# Patient Record
Sex: MALE | Race: WHITE | NOT HISPANIC OR LATINO | Employment: FULL TIME | URBAN - NONMETROPOLITAN AREA
[De-identification: names, ages, dates, MRNs, and addresses within clinical notes are randomized per-mention and may not be internally consistent; named-entity substitution may affect disease eponyms.]

---
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
## 2020-10-04 ENCOUNTER — VIRTUAL VISIT (OUTPATIENT)
Dept: FAMILY MEDICINE | Facility: OTHER | Age: 20
End: 2020-10-04

## 2020-10-05 ENCOUNTER — ALLIED HEALTH/NURSE VISIT (OUTPATIENT)
Dept: FAMILY MEDICINE | Facility: OTHER | Age: 20
End: 2020-10-05
Attending: NURSE PRACTITIONER
Payer: COMMERCIAL

## 2020-10-05 DIAGNOSIS — Z20.822 EXPOSURE TO COVID-19 VIRUS: Primary | ICD-10-CM

## 2020-10-05 DIAGNOSIS — R07.0 THROAT PAIN: ICD-10-CM

## 2020-10-05 PROCEDURE — C9803 HOPD COVID-19 SPEC COLLECT: HCPCS

## 2020-10-05 PROCEDURE — 99207 PR NO CHARGE LOS: CPT

## 2020-10-05 PROCEDURE — U0003 INFECTIOUS AGENT DETECTION BY NUCLEIC ACID (DNA OR RNA); SEVERE ACUTE RESPIRATORY SYNDROME CORONAVIRUS 2 (SARS-COV-2) (CORONAVIRUS DISEASE [COVID-19]), AMPLIFIED PROBE TECHNIQUE, MAKING USE OF HIGH THROUGHPUT TECHNOLOGIES AS DESCRIBED BY CMS-2020-01-R: HCPCS | Mod: ZL | Performed by: FAMILY MEDICINE

## 2020-10-06 LAB
SARS-COV-2 RNA SPEC QL NAA+PROBE: NOT DETECTED
SPECIMEN SOURCE: NORMAL

## 2020-10-07 NOTE — PROGRESS NOTES
"Date: 10/04/2020 13:16:16  Clinician: Alex Antony  Clinician NPI: [id number]  Patient: Bruce Chandra  Patient : 2000  Patient Address: [address] [place] [address]  Patient Phone: [phone]  Visit Protocol: URI  Patient Summary:  Bruce is a 20 year old ( : 2000 ) male who initiated a [facility] Visit for COVID-19 (Coronavirus) evaluation and screening. When asked the question \"Please sign me up to receive news, health information and promotions. \", Bruce responded \"No\".    Bruce states his symptoms started 1-2 days ago.   His symptoms consist of wheezing, a cough, rhinitis, malaise, and a sore throat.   Symptom details     Nasal secretions: The color of his mucus is yellow and green.    Cough: Bruce coughs a few times an hour and his cough is not more bothersome at night. Phlegm comes into his throat when he coughs. He does not believe his cough is caused by post-nasal drip. The color of the phlegm is yellow.     Sore throat: Bruce reports having moderate throat pain (4-6 on a 10 point pain scale), does not have exudate on his tonsils, and can swallow liquids. He is not sure if the lymph nodes in his neck are enlarged. A rash has not appeared on the skin since the sore throat started.     Wheezing: Bruce has not ever been diagnosed with asthma. Additional wheezing details as reported by the patient (free text): Has not        Bruce denies having ear pain, headache, fever, nasal congestion, anosmia, vomiting, nausea, facial pain or pressure, myalgias, chills, teeth pain, ageusia, and diarrhea. He also denies taking antibiotic medication in the past month and having recent facial or sinus surgery in the past 60 days. He is not experiencing dyspnea.   Precipitating events  Within the past week, Bruce has not been exposed to someone with strep throat. He has not recently been exposed to someone with influenza. Bruce has been in close contact with the following high risk individuals: adults 65 or " older and immunocompromised people.   Pertinent COVID-19 (Coronavirus) information  In the past 14 days, Bruce has not worked in a congregate living setting.   He does not work or volunteer as healthcare worker or a  and does not work or volunteer in a healthcare facility.   Bruce also has not lived in a congregate living setting in the past 14 days. He does not live with a healthcare worker.   Bruce has had a close contact with a laboratory-confirmed COVID-19 patient within 14 days of symptom onset. Additional information about contact with COVID-19 (Coronavirus) patient as reported by the patient (free text): Prisca Barriga tested positive today and was tested on Friday and I was with her Friday   Since December 2019, Bruce and has had upper respiratory infection (URI) or influenza-like illness. Has not been diagnosed with lab-confirmed COVID-19 test      Date(s) of previous URI or influenza-like illness (free-text): December 2019     Symptoms Bruce experienced during previous URI or influenza-like illness as reported by the patient (free-text): Throwing up coughcouldnt breathe        Pertinent medical history  Brcue needs a return to work/school note.   Weight: 180 lbs   Bruce smokes or uses smokeless tobacco.   Weight: 180 lbs    MEDICATIONS: No current medications, ALLERGIES: NKDA  Clinician Response:  Dear Bruce,   Your symptoms show that you may have coronavirus (COVID-19). This illness can cause fever, cough and trouble breathing. Many people get a mild case and get better on their own. Some people can get very sick.  What should I do?  We would like to test you for this virus.   1. Please call [phone] to schedule your visit. Explain [facility]. Be ready to share your [facility] visit ID number.  The following will serve as your written order for this COVID Test, ordered by me, for the indication of suspected COVID [Z20.828]: The test will be ordered  "in Epic, our electronic health record, after you are scheduled. It will show as ordered and authorized by Manpreet Mcdaniel MD.  Order: COVID-19 (Coronavirus) PCR for SYMPTOMATIC testing from [facility].      2. When it's time for your COVID test:  Stay at least 6 feet away from others. (If someone will drive you to your test, stay in the backseat, as far away from the  as you can.)   Cover your mouth and nose with a mask, tissue or washcloth.  Go straight to the testing site. Don't make any stops on the way there or back.      3.Starting now: Stay home and away from others (self-isolate) until:   You've had no fever---and no medicine that reduces fever---for one full day (24 hours). And...   Your other symptoms have gotten better. For example, your cough or breathing has improved. And...   At least 10 days have passed since your symptoms started.       During this time, don't leave the house except for testing or medical care.   Stay in your own room, even for meals. Use your own bathroom if you can.   Stay away from others in your home. No hugging, kissing or shaking hands. No visitors.  Don't go to work, school or anywhere else.    Clean \"high touch\" surfaces often (doorknobs, counters, handles, etc.). Use a household cleaning spray or wipes. You'll find a full list of  on the EPA website: www.epa.gov/pesticide-registration/list-n-disinfectants-use-against-sars-cov-2.   Cover your mouth and nose with a mask, tissue or washcloth to avoid spreading germs.  Wash your hands and face often. Use soap and water.  Caregivers in these groups are at risk for severe illness due to COVID-19:  o People 65 years and older  o People who live in a nursing home or long-term care facility  o People with chronic disease (lung, heart, cancer, diabetes, kidney, liver, immunologic)  o People who have a weakened immune system, including those who:   Are in cancer treatment  Take medicine that weakens the immune system, such as " corticosteroids  Had a bone marrow or organ transplant  Have an immune deficiency  Have poorly controlled HIV or AIDS  Are obese (body mass index of 40 or higher)  Smoke regularly   o Caregivers should wear gloves while washing dishes, handling laundry and cleaning bedrooms and bathrooms.  o Use caution when washing and drying laundry: Don't shake dirty laundry, and use the warmest water setting that you can.  o For more tips, go to www.cdc.gov/coronavirus/2019-ncov/downloads/10Things.pdf.    How can I take care of myself?    Get lots of rest. Drink extra fluids (unless a doctor has told you not to).   Take Tylenol (acetaminophen) for fever or pain. If you have liver or kidney problems, ask your family doctor if it's okay to take Tylenol.   Adults can take either:    650 mg (two 325 mg pills) every 4 to 6 hours, or...   1,000 mg (two 500 mg pills) every 8 hours as needed.    Note: Don't take more than 3,000 mg in one day. Acetaminophen is found in many medicines (both prescribed and over-the-counter medicines). Read all labels to be sure you don't take too much.   For children, check the Tylenol bottle for the right dose. The dose is based on the child's age or weight.    If you have other health problems (like cancer, heart failure, an organ transplant or severe kidney disease): Call your specialty clinic if you don't feel better in the next 2 days.       Know when to call 911. Emergency warning signs include:    Trouble breathing or shortness of breath Pain or pressure in the chest that doesn't go away Feeling confused like you haven't felt before, or not being able to wake up Bluish-colored lips or face.  Where can I get more information?    [facility] -- About COVID-19: [email or web address]   CDC -- What to Do If You're Sick: www.cdc.gov/coronavirus/2019-ncov/about/steps-when-sick.html   CDC -- Ending Home Isolation: www.cdc.gov/coronavirus/2019-ncov/hcp/disposition-in-home-patients.html   [facility]  -- Caring for Someone: www.cdc.gov/coronavirus/2019-ncov/if-you-are-sick/care-for-someone.html   [facility] -- Interim Guidance for Hospital Discharge to Home: www.health.[facility].[place]/diseases/coronavirus/hcp/hospdischarge.pdf   [facility] clinical trials (COVID-19 research studies): clinicalaffairs.[facility]/n-clinical-trials    Below are the COVID-19 hotlines at the [place] Department of Health ([facility]). Interpreters are available.    For health questions: Call 374-843-8695 or 1-353.250.6647 (7 a.m. to 7 p.m.) For questions about schools and childcare: Call 226-919-6171 or 1-611.813.9454 (7 a.m. to 7 p.m.)    Diagnosis: Other malaise  Diagnosis ICD: R53.81  Prescription: albuterol sulfate (Proventil HFA) 90 mcg/actuation inhalation HFA aerosol inhaler 1 200 inhalation aerosol with adapter (proventil hfa or equivalent), 0 days supply. Inhale 2 puffs every 4 hours as needed. Refills: 0, Refill as needed: no, Allow substitutions: yes

## 2020-11-04 ENCOUNTER — ALLIED HEALTH/NURSE VISIT (OUTPATIENT)
Dept: FAMILY MEDICINE | Facility: OTHER | Age: 20
End: 2020-11-04
Payer: COMMERCIAL

## 2020-11-04 DIAGNOSIS — R05.9 COUGH: ICD-10-CM

## 2020-11-04 DIAGNOSIS — Z20.822 EXPOSURE TO 2019 NOVEL CORONAVIRUS: Primary | ICD-10-CM

## 2020-11-04 DIAGNOSIS — R09.89 RUNNY NOSE: ICD-10-CM

## 2020-11-04 PROCEDURE — 99207 PR NO CHARGE NURSE ONLY: CPT

## 2020-11-04 PROCEDURE — U0003 INFECTIOUS AGENT DETECTION BY NUCLEIC ACID (DNA OR RNA); SEVERE ACUTE RESPIRATORY SYNDROME CORONAVIRUS 2 (SARS-COV-2) (CORONAVIRUS DISEASE [COVID-19]), AMPLIFIED PROBE TECHNIQUE, MAKING USE OF HIGH THROUGHPUT TECHNOLOGIES AS DESCRIBED BY CMS-2020-01-R: HCPCS | Mod: ZL

## 2020-11-04 PROCEDURE — C9803 HOPD COVID-19 SPEC COLLECT: HCPCS

## 2020-11-04 NOTE — PROGRESS NOTES
Patient is here for covid testing for exposure, runny nose, congestion.   Dayana Santiago LPN on 11/4/2020 at 8:11 AM

## 2020-11-05 LAB
SARS-COV-2 RNA SPEC QL NAA+PROBE: NOT DETECTED
SPECIMEN SOURCE: NORMAL

## 2020-11-15 ENCOUNTER — HOSPITAL ENCOUNTER (EMERGENCY)
Facility: OTHER | Age: 20
Discharge: HOME | End: 2020-11-15
Attending: FAMILY MEDICINE | Admitting: FAMILY MEDICINE
Payer: COMMERCIAL

## 2020-11-15 VITALS
SYSTOLIC BLOOD PRESSURE: 138 MMHG | RESPIRATION RATE: 18 BRPM | HEIGHT: 72 IN | BODY MASS INDEX: 24.38 KG/M2 | DIASTOLIC BLOOD PRESSURE: 77 MMHG | WEIGHT: 180 LBS | OXYGEN SATURATION: 98 % | TEMPERATURE: 97.9 F | HEART RATE: 83 BPM

## 2020-11-15 DIAGNOSIS — S01.01XA SCALP LACERATION, INITIAL ENCOUNTER: ICD-10-CM

## 2020-11-15 PROCEDURE — 99283 EMERGENCY DEPT VISIT LOW MDM: CPT | Mod: 25 | Performed by: FAMILY MEDICINE

## 2020-11-15 PROCEDURE — 99282 EMERGENCY DEPT VISIT SF MDM: CPT | Mod: 25 | Performed by: FAMILY MEDICINE

## 2020-11-15 PROCEDURE — 12002 RPR S/N/AX/GEN/TRNK2.6-7.5CM: CPT | Performed by: FAMILY MEDICINE

## 2020-11-15 PROCEDURE — 250N000011 HC RX IP 250 OP 636: Performed by: FAMILY MEDICINE

## 2020-11-15 PROCEDURE — 90471 IMMUNIZATION ADMIN: CPT | Performed by: FAMILY MEDICINE

## 2020-11-15 PROCEDURE — 90715 TDAP VACCINE 7 YRS/> IM: CPT | Performed by: FAMILY MEDICINE

## 2020-11-15 RX ADMIN — TETANUS TOXOID, REDUCED DIPHTHERIA TOXOID AND ACELLULAR PERTUSSIS VACCINE, ADSORBED 0.5 ML: 5; 2.5; 8; 8; 2.5 SUSPENSION INTRAMUSCULAR at 04:10

## 2020-11-15 ASSESSMENT — MIFFLIN-ST. JEOR: SCORE: 1864.47

## 2020-11-15 NOTE — ED PROVIDER NOTES
History     Chief Complaint   Patient presents with     Head Laceration     HPI  Bruce Rodriguez is a 20 year old male who presents to the ER with laceration to his head that he sustained about 2 hours ago. He does not think he had LOC. Came in because his head hurt where the laceration was.     Allergies:  Allergies   Allergen Reactions     Seasonal Allergies      Stuffed up in the Spring and Fall       Problem List:    Patient Active Problem List    Diagnosis Date Noted     Allergic rhinitis 02/24/2018     Priority: Medium     Concussion 02/15/2011     Priority: Medium     Dysthymic disorder 12/14/2010     Priority: Medium        Past Medical History:    Past Medical History:   Diagnosis Date     Personal history of other medical treatment (CODE)      Personal history of other medical treatment (CODE)        Past Surgical History:    Past Surgical History:   Procedure Laterality Date     OTHER SURGICAL HISTORY      90220.0,PAST SURGICAL HISTORY,06/01 PE tube placement       Family History:    Family History   Problem Relation Age of Onset     Other - See Comments Father         Paralized in accident; lower legs.     Genetic Disorder Other         Genetic,Mother Anxiety disorder/depression.  Mom was in MVA requiring back surgery.     Genetic Disorder Other         Genetic,Mother Anxiety disorder/depression.  Mom was in MVA requiring back surgery.-Dad MVA - in wheelchair.       Social History:  Marital Status:  Single [1]  Social History     Tobacco Use     Smoking status: Current Every Day Smoker     Smokeless tobacco: Current User     Tobacco comment: Quit smoking: mom outside   Substance Use Topics     Alcohol use: No     Drug use: Unknown     Types: Other     Comment: Drug use: No        Medications:    No current outpatient medications on file.        Review of Systems   All other systems reviewed and are negative.      Physical Exam   BP: 138/77  Pulse: 111  Temp: 97.9  F (36.6  C)  Resp: 18  Height: 182.9 cm  (6')  Weight: 81.6 kg (180 lb)  SpO2: 98 %      Physical Exam  Vitals signs and nursing note reviewed.   Skin:     General: Skin is warm.      Comments: Laceration on the right occiput 2 inches in length         ED Course   [facility] Procedure Note        Laceration Repair:    Performed by: Armin Melendez MD  Authorized by: Armin Melendez MD  Consent given by: Patient who states understanding of the procedure being performed after discussing the risks, benefits and alternatives.    Preparation: Patient was prepped and draped in usual sterile fashion.  Irrigation solution: saline    Body area:scalp  Laceration length: 5cm  Contamination: The wound is not contaminated.  Foreign bodies:none  Tendon involvement: none  Anesthesia: Local  Local anesthetic: Lidocaine     1%, with epinephrine  Anesthetic total: 3ml    Debridement: none  Skin closure: Closed with 7 Staples  Technique: interrupted  Approximation: close  Approximation difficulty: simple    Patient tolerance: Patient tolerated the procedure well with no immediate complications.       No results found for this or any previous visit (from the past 24 hour(s)).    Medications   Tdap (tetanus-diptheria-acell pertussis) (BOOSTRIX) injection 0.5 mL (has no administration in time range)       Assessments & Plan (with Medical Decision Making)     I have reviewed the nursing notes.    I have reviewed the findings, diagnosis, plan and need for follow up with the patient.    New Prescriptions    No medications on file       Final diagnoses:   Scalp laceration, initial encounter       11/15/2020   [facility]     Armin Melendez MD  11/15/20 0409

## 2020-11-15 NOTE — ED TRIAGE NOTES
Pt presents to the ER with a fall that happened approx 2 hours ago.  Pt slipped outside and hit his head on a wood bin.  Pt has a laceration to the back of his head.  Bleeding is controlled.  Pt states he has a headache.  Does not know if he lost consciousness.  Admits to having a few drinks tonight.

## 2020-11-15 NOTE — ED AVS SNAPSHOT
[facility]  [address]  [place] [address]  Phone: [phone]  Fax: [phone]                                    Bruce Rodriguez   MRN: [id number]    Department: [facility]   Date of Visit: 11/15/2020           After Visit Summary Signature Page    I have received my discharge instructions, and my questions have been answered. I have discussed any challenges I see with this plan with the nurse or doctor.    ..........................................................................................................................................  Patient/Patient Representative Signature      ..........................................................................................................................................  Patient Representative Print Name and Relationship to Patient    ..................................................               ................................................  Date                                   Time    ..........................................................................................................................................  Reviewed by Signature/Title    ...................................................              ..............................................  Date                                               Time          22EPIC Rev 08/18

## 2020-11-15 NOTE — DISCHARGE INSTRUCTIONS
You can wash your hair when you get home tonight, but then I would try not to wash for 24-48 hours.   Staples removed in 10 days.

## 2020-12-27 ENCOUNTER — HEALTH MAINTENANCE LETTER (OUTPATIENT)
Age: 20
End: 2020-12-27

## 2021-05-13 ENCOUNTER — HOSPITAL ENCOUNTER (EMERGENCY)
Facility: OTHER | Age: 21
Discharge: HOME | End: 2021-05-13
Attending: PHYSICIAN ASSISTANT | Admitting: PHYSICIAN ASSISTANT
Payer: COMMERCIAL

## 2021-05-13 VITALS
SYSTOLIC BLOOD PRESSURE: 144 MMHG | BODY MASS INDEX: 26.9 KG/M2 | WEIGHT: 203 LBS | OXYGEN SATURATION: 98 % | RESPIRATION RATE: 18 BRPM | DIASTOLIC BLOOD PRESSURE: 96 MMHG | TEMPERATURE: 97.7 F | HEART RATE: 95 BPM | HEIGHT: 73 IN

## 2021-05-13 DIAGNOSIS — R68.84 JAW PAIN: ICD-10-CM

## 2021-05-13 PROCEDURE — 99283 EMERGENCY DEPT VISIT LOW MDM: CPT | Performed by: PHYSICIAN ASSISTANT

## 2021-05-13 PROCEDURE — 99282 EMERGENCY DEPT VISIT SF MDM: CPT | Performed by: PHYSICIAN ASSISTANT

## 2021-05-13 RX ORDER — NAPROXEN SODIUM 550 MG
550 TABLET ORAL 2 TIMES DAILY WITH MEALS
Qty: 20 TABLET | Refills: 0 | Status: SHIPPED | OUTPATIENT
Start: 2021-05-13 | End: 2021-05-23

## 2021-05-13 RX ORDER — PENICILLIN V POTASSIUM 500 MG/1
500 TABLET, FILM COATED ORAL 4 TIMES DAILY
Qty: 40 TABLET | Refills: 0 | Status: SHIPPED | OUTPATIENT
Start: 2021-05-13 | End: 2021-05-23

## 2021-05-13 ASSESSMENT — MIFFLIN-ST. JEOR: SCORE: 1984.68

## 2021-05-14 NOTE — ED TRIAGE NOTES
"ED Nursing Triage Note (General)   ________________________________    Bruce ANCELMO Rodriguez is a 20 year old Male that presents to triage private car  With history of  Left side, bottom molar pain that started today.  BP (!) 144/96   Pulse 95   Temp 97.7  F (36.5  C) (Tympanic)   Resp 18   Ht 1.854 m (6' 1\")   Wt 92.1 kg (203 lb)   SpO2 98%   BMI 26.78 kg/m  t  Patient appears alert  and oriented, in no acute distress., and cooperative and pleasant behavior.  GCS Total = 15  Airway: intact  Breathing noted as Normal  Circulation Normal  Skin:  Normal  Action taken:  Triage to critical care immediately      PRE HOSPITAL PRIOR LIVING SITUATION Spouse  "

## 2021-05-14 NOTE — ED PROVIDER NOTES
History     Chief Complaint   Patient presents with     Dental Pain     HPI  Bruce Rodriguez is a 20 year old male who presents to the emergency department for evaluation of left lower jaw discomfort she got worse with eating today.  He does not have neck pain or trismus no fevers or chills cough chest pain shortness of breath no abdominal pain or constipation rashes or trauma.  He is handling his secretions without difficulty.  Allergies:  Allergies   Allergen Reactions     Seasonal Allergies      Stuffed up in the Spring and Fall       Problem List:    Patient Active Problem List    Diagnosis Date Noted     Allergic rhinitis 02/24/2018     Priority: Medium     Concussion 02/15/2011     Priority: Medium     Dysthymic disorder 12/14/2010     Priority: Medium        Past Medical History:    Past Medical History:   Diagnosis Date     Personal history of other medical treatment (CODE)      Personal history of other medical treatment (CODE)        Past Surgical History:    Past Surgical History:   Procedure Laterality Date     OTHER SURGICAL HISTORY      36057.0,PAST SURGICAL HISTORY,06/01 PE tube placement       Family History:    Family History   Problem Relation Age of Onset     Other - See Comments Father         Paralized in accident; lower legs.     Genetic Disorder Other         Genetic,Mother Anxiety disorder/depression.  Mom was in MVA requiring back surgery.     Genetic Disorder Other         Genetic,Mother Anxiety disorder/depression.  Mom was in MVA requiring back surgery.-Dad MVA - in wheelchair.       Social History:  Marital Status:  Single [1]  Social History     Tobacco Use     Smoking status: Current Every Day Smoker     Smokeless tobacco: Current User     Tobacco comment: Quit smoking: mom outside   Substance Use Topics     Alcohol use: No     Drug use: Unknown     Types: Other     Comment: Drug use: No        Medications:    ANAPROX  MG tablet  penicillin V (VEETID) 500 MG tablet          Review  "of Systems     Pertinent positives and negatives are as above in the HPI. 10 point review of systems is otherwise negative.      Physical Exam   BP: (!) 144/96  Pulse: 95  Temp: 97.7  F (36.5  C)  Resp: 18  Height: 185.4 cm (6' 1\")  Weight: 92.1 kg (203 lb)  SpO2: 98 %      Physical Exam  Exam:  Constitutional: healthy, alert and no distress  Head: Normocephalic. No masses, lesions, tenderness or abnormalities  Neck: Neck supple. No adenopathy. Thyroid symmetric, normal size,, Carotids without bruits.  ENT: ENT exam normal, no neck nodes or sinus tenderness oropharynx examination is immaculate.  I do not feel any buccal or lingual side abscess.  Cannot rule out periapical abscess but the remainder the oropharynx lamination negative   Cardiovascular: negative, PMI normal. No lifts, heaves, or thrills. RRR. No murmurs, clicks gallops or rub  Respiratory: negative, Percussion normal. Good diaphragmatic excursion. Lungs clear  Gastrointestinal: Abdomen soft, non-tender. BS normal. No masses, organomegaly          ED Course        Procedures            No results found for this or any previous visit (from the past 24 hour(s)).    Medications - No data to display    Assessments & Plan (with Medical Decision Making)     I have reviewed the nursing notes.    I have reviewed the findings, diagnosis, plan and need for follow up with the patient.  Patient's differential diagnosis as follows: Periapical abscess lemierre syndrome, mononucleosis, peritonsillar abscess, cellulitis, retropharyngeal abscess, strep throat, gastroesophageal reflux disease, postnasal drip, allergic rhinitis, acute upper restaurant infection, influenza among others as the differential is quite broad.  I would encourage the gentleman close follow-up with the primary dentist take antibiotics and analgesia as prescribed return if symptoms worsen  I explained my diagnostic considerations and recommendations and the patient voiced an understanding and was in " agreement with the treatment plan. All questions were answered. We discussed potential side effects of any prescribed or recommended therapies, as well as expectations for response to treatments.    New Prescriptions    ANAPROX  MG TABLET    Take 1 tablet (550 mg) by mouth 2 times daily (with meals) for 10 days    PENICILLIN V (VEETID) 500 MG TABLET    Take 1 tablet (500 mg) by mouth 4 times daily for 10 days       Final diagnoses:   Jaw pain       5/13/2021   [facility]     Nate Burden PA-C  05/13/21 2025

## 2021-10-09 ENCOUNTER — HEALTH MAINTENANCE LETTER (OUTPATIENT)
Age: 21
End: 2021-10-09

## 2021-12-27 ENCOUNTER — OFFICE VISIT (OUTPATIENT)
Dept: FAMILY MEDICINE | Facility: OTHER | Age: 21
End: 2021-12-27
Attending: PHYSICIAN ASSISTANT
Payer: COMMERCIAL

## 2021-12-27 VITALS
BODY MASS INDEX: 27.46 KG/M2 | WEIGHT: 214 LBS | HEIGHT: 74 IN | TEMPERATURE: 99.1 F | DIASTOLIC BLOOD PRESSURE: 86 MMHG | OXYGEN SATURATION: 97 % | SYSTOLIC BLOOD PRESSURE: 138 MMHG | RESPIRATION RATE: 16 BRPM | HEART RATE: 92 BPM

## 2021-12-27 DIAGNOSIS — K04.7 DENTAL INFECTION: Primary | ICD-10-CM

## 2021-12-27 PROCEDURE — G0463 HOSPITAL OUTPT CLINIC VISIT: HCPCS

## 2021-12-27 PROCEDURE — 99213 OFFICE O/P EST LOW 20 MIN: CPT | Performed by: PHYSICIAN ASSISTANT

## 2021-12-27 RX ORDER — ARIPIPRAZOLE 5 MG/1
1 TABLET ORAL DAILY
COMMUNITY
Start: 2021-12-06

## 2021-12-27 RX ORDER — KETOROLAC TROMETHAMINE 10 MG/1
10 TABLET, FILM COATED ORAL EVERY 6 HOURS PRN
Qty: 20 TABLET | Refills: 0 | Status: SHIPPED | OUTPATIENT
Start: 2021-12-27

## 2021-12-27 RX ORDER — DEXTROAMPHETAMINE SULFATE, DEXTROAMPHETAMINE SACCHARATE, AMPHETAMINE SULFATE AND AMPHETAMINE ASPARTATE 7.5; 7.5; 7.5; 7.5 MG/1; MG/1; MG/1; MG/1
1 CAPSULE, EXTENDED RELEASE ORAL DAILY
COMMUNITY
Start: 2021-12-07

## 2021-12-27 RX ORDER — NAPROXEN SODIUM 550 MG/1
550 TABLET ORAL 2 TIMES DAILY WITH MEALS
Qty: 20 TABLET | Refills: 0 | Status: CANCELLED | OUTPATIENT
Start: 2021-12-27 | End: 2022-01-06

## 2021-12-27 RX ORDER — MIRTAZAPINE 15 MG/1
1 TABLET, FILM COATED ORAL DAILY
COMMUNITY
Start: 2021-12-06

## 2021-12-27 RX ORDER — LAMOTRIGINE 150 MG/1
1 TABLET ORAL DAILY
COMMUNITY
Start: 2021-12-06

## 2021-12-27 ASSESSMENT — MIFFLIN-ST. JEOR: SCORE: 2045.45

## 2021-12-27 ASSESSMENT — PAIN SCALES - GENERAL: PAINLEVEL: WORST PAIN (10)

## 2021-12-27 NOTE — NURSING NOTE
"Chief Complaint   Patient presents with     Dental Pain     X 3 days    Initial /86   Pulse 92   Temp 99.1  F (37.3  C) (Tympanic)   Resp 16   Ht 1.88 m (6' 2\")   Wt 97.1 kg (214 lb)   SpO2 97%   BMI 27.48 kg/m   Estimated body mass index is 27.48 kg/m  as calculated from the following:    Height as of this encounter: 1.88 m (6' 2\").    Weight as of this encounter: 97.1 kg (214 lb).       Medication Reconciliation: Complete      FOOD SECURITY SCREENING QUESTIONS:    The next two questions are to help us understand your food security.  If you are feeling you need any assistance in this area, we have resources available to support you today.    Hunger Vital Signs:  Have you worried about running out of food and not having money to buy more?Never        Norma J. Gosselin, LPN   "

## 2021-12-27 NOTE — LETTER
My Depression Action Plan  Name: Bruce Rodriguez   Date of Birth 2000  Date: 12/27/2021    My doctor: No Ref-Primary, Physician   My clinic: [facility]  [address]  [place] [id number] [phone]          GREEN    ZONE   Good Control    What it looks like:     Things are going generally well. You have normal ups and downs. You may even feel depressed from time to time, but bad moods usually last less than a day.   What you need to do:  1. Continue to care for yourself (see self care plan)  2. Check your depression survival kit and update it as needed  3. Follow your physician s recommendations including any medication.  4. Do not stop taking medication unless you consult with your physician first.           YELLOW         ZONE Getting Worse    What it looks like:     Depression is starting to interfere with your life.     It may be hard to get out of bed; you may be starting to isolate yourself from others.    Symptoms of depression are starting to last most all day and this has happened for several days.     You may have suicidal thoughts but they are not constant.   What you need to do:     1. Call your care team. Your response to treatment will improve if you keep your care team informed of your progress. Yellow periods are signs an adjustment may need to be made.     2. Continue your self-care.  Just get dressed and ready for the day.  Don't give yourself time to talk yourself out of it.    3. Talk to someone in your support network.    4. Open up your Depression Self-Care Plan/Wellness Kit.           RED    ZONE Medical Alert - Get Help    What it looks like:     Depression is seriously interfering with your life.     You may experience these or other symptoms: You can t get out of bed most days, can t work or engage in other necessary activities, you have trouble taking care of basic hygiene, or basic responsibilities, thoughts of suicide or death that  will not go away, self-injurious behavior.     What you need to do:  1. Call your care team and request a same-day appointment. If they are not available (weekends or after hours) call your local crisis line, emergency room or 911.          Depression Self-Care Plan / Wellness Kit    Many people find that medication and therapy are helpful treatments for managing depression. In addition, making small changes to your everyday life can help to boost your mood and improve your wellbeing. Below are some tips for you to consider. Be sure to talk with your medical provider and/or behavioral health consultant if your symptoms are worsening or not improving.     Sleep   Sleep hygiene  means all of the habits that support good, restful sleep. It includes maintaining a consistent bedtime and wake time, using your bedroom only for sleeping or sex, and keeping the bedroom dark and free of distractions like a computer, smartphone, or television.     Develop a Healthy Routine  Maintain good hygiene. Get out of bed in the morning, make your bed, brush your teeth, take a shower, and get dressed. Don t spend too much time viewing media that makes you feel stressed. Find time to relax each day.    Exercise  Get some form of exercise every day. This will help reduce pain and release endorphins, the  feel good  chemicals in your brain. It can be as simple as just going for a walk or doing some gardening, anything that will get you moving.      Diet  Strive to eat healthy foods, including fruits and vegetables. Drink plenty of water. Avoid excessive sugar, caffeine, alcohol, and other mood-altering substances.     Stay Connected with Others  Stay in touch with friends and family members.    Manage Your Mood  Try deep breathing, massage therapy, biofeedback, or meditation. Take part in fun activities when you can. Try to find something to smile about each day.     Psychotherapy  Be open to working with a therapist if your provider  recommends it.     Medication  Be sure to take your medication as prescribed. Most anti-depressants need to be taken every day. It usually takes several weeks for medications to work. Not all medicines work for all people. It is important to follow-up with your provider to make sure you have a treatment plan that is working for you. Do not stop your medication abruptly without first discussing it with your provider.    Crisis Resources   These hotlines are for both adults and children. They and are open 24 hours a day, 7 days a week unless noted otherwise.      National Suicide Prevention Lifeline   0-894-356-KHZS (6931)      Crisis Text Line    www.crisistextline.org  Text HOME to 643950 from anywhere in the [place], anytime, about any type of crisis. A live, trained crisis counselor will receive the text and respond quickly.      [facility] Lifeline for LGBTQ Youth  A national crisis intervention and suicide lifeline for LGBTQ youth under 25. Provides a safe place to talk without judgement. Call 1-643.562.3411; text START to 871065 or visit www.thetrevorproject.org to talk to a trained counselor.      For [facility] crisis numbers, visit the [facility] website at:  https://[place].gov/[place]hs/people-we-serve/adults/health-care/mental-health/resources/crisis-contacts.jsp

## 2021-12-27 NOTE — PROGRESS NOTES
ASSESSMENT/PLAN:    I have reviewed the nursing notes.  I have reviewed the findings, diagnosis, plan and need for follow up with the patient.    1. Dental infection  - amoxicillin-clavulanate (AUGMENTIN) 875-125 MG tablet; Take 1 tablet by mouth 2 times daily for 10 days  Dispense: 20 tablet; Refill: 0  - ketorolac (TORADOL) 10 MG tablet; Take 1 tablet (10 mg) by mouth every 6 hours as needed for moderate pain  Dispense: 20 tablet; Refill: 0  - Vital signs stable.  Physical exam consistent with dental pain/dental abscess.  Will treat patient with oral antibiotic Augmentin PO BID x 10 days.  Discussed Magic mouthwash and/or Chlorhexidine mouthwash with patient.  Also discussed use of probiotic/increasing yogurt intake while on antibiotic as can lead to GI upset, both these remedies are available over-the-counter.  Discussed prompt follow-up with dentist.  If fevers, chills, worsening signs or symptoms of infection occur patient is agreeable to return for reevaluation. Patient is in agreement and understanding of the above treatment plan. All questions and concerns were addressed and answered to patient's satisfaction. AVS reviewed with patient.     Discussed warning signs/symptoms indicative of need to f/u    Follow up if symptoms persist or worsen or concerns    I explained my diagnostic considerations and recommendations to the patient, who voiced understanding and agreement with the treatment plan. All questions were answered. We discussed potential side effects of any prescribed or recommended therapies, as well as expectations for response to treatments.    Tiffany García PA-C  12/27/2021  1:57 PM    HPI:    Bruce Rodriguez is a 21 year old male  who presents to [facility] today for concerns of dental pain x 3 days    Symptoms:  Location: upper left central - 1/2 fake tooth  Extraoral: pain on palpation  Intraoral: pain  Jaw pain: No  Any masses or occlusion: No  Dysphagia: No  Facial swelling:  "No  Discharge/pus: No  Sinus pain or pressure: YES, left maxillary  Fevers, chills, signs of infection: No  Recent infections: No  Recent antibiotic treatment/course: No    Last dental visit: high school   Any prior dental problems: YES    Tx Tried: OTC meds - last dose 2.5 hours prior    Seasonal allergies.     PCP: none    Past Medical History:   Diagnosis Date     Personal history of other medical treatment (CODE)     No Comments Provided     Personal history of other medical treatment (CODE)     No Comments Provided     Past Surgical History:   Procedure Laterality Date     OTHER SURGICAL HISTORY      64113.0,PAST SURGICAL HISTORY, PE tube placement     Social History     Tobacco Use     Smoking status: Former Smoker     Quit date: 2021     Years since quittin.3     Smokeless tobacco: Former User     Tobacco comment: Quit smoking: mom outside   Substance Use Topics     Alcohol use: No     Current Outpatient Medications   Medication Sig Dispense Refill     ADDERALL XR 30 MG 24 hr capsule Take 1 capsule by mouth daily       ARIPiprazole (ABILIFY) 5 MG tablet Take 1 tablet by mouth daily       lamoTRIgine (LAMICTAL) 150 MG tablet Take 1 tablet by mouth daily       mirtazapine (REMERON) 15 MG tablet Take 1 tablet by mouth daily       Allergies   Allergen Reactions     Seasonal Allergies      Stuffed up in the Spring and Fall     Past medical history, past surgical history, current medications and allergies reviewed and accurate to the best of my knowledge.      ROS:  Refer to HPI    /86   Pulse 92   Temp 99.1  F (37.3  C) (Tympanic)   Resp 16   Ht 1.88 m (6' 2\")   Wt 97.1 kg (214 lb)   SpO2 97%   BMI 27.48 kg/m      EXAM:  General Appearance: Well appearing 21 year old male, appropriate appearance for age. No acute distress   Ears: Left TM intact, translucent with bony landmarks appreciated, no erythema, no effusion, no bulging, no purulence.  Right TM intact, translucent with bony landmarks " appreciated, no erythema, no effusion, no bulging, no purulence.  Left auditory canal clear.  Right auditory canal clear.  Normal external ears, non tender.  Eyes: conjunctivae normal without erythema or irritation, corneas clear, no drainage or crusting, no eyelid swelling, pupils equal   Orophayrnx: diffuse dental carries throughout, gingivitis noted diffusely, moist mucous membranes, posterior pharynx without erythema, tonsils symmetric, no erythema, no exudates or petechiae, no post nasal drip seen, no trismus, voice clear.    Sinuses:  No sinus tenderness upon palpation of the frontal or maxillary sinuses  Nose:  Bilateral nares: no erythema, no edema, no drainage or congestion   Neck: supple without adenopathy  Respiratory: normal chest wall and respirations.  Normal effort.  Clear to auscultation bilaterally, no wheezing, crackles or rhonchi.  No increased work of breathing.  No cough appreciated.  Cardiac: RRR with no murmurs  Dermatological: no rashes noted of exposed skin  Psychological: normal affect, alert, oriented, and pleasant.     Labs:  None     Xray:  None

## 2021-12-27 NOTE — PATIENT INSTRUCTIONS
Please refer to your AVS for follow up and pain/symptoms management recommendations (I.e.: medications, helpful conservative treatment modalities, appropriate follow up if need to a specialist or family practice, etc.). Please return to urgent care if your symptoms change or worsen.     Discharge instructions:  -Follow up with dentist  -If you were prescribed a medication(s), please take this as prescribed/directed  -Monitor your symptoms, if changing/worsening, return to UC/ER or PCP for follow up    You were seen in the urgent care today in regards to dental pain.   -Recommendations for dental pain include: follow up with your dentist within 3-5 days.   -Keep hydrated with clear fluids (water best - try to avoid coffee and sugar filled drink).   -Monitor your symptoms for fevers, chills, signs of infection, shortness of breath, difficulty breathing/speaking, increased pain/worsening symptoms - if these occur call your PCP or dentist or return to the ER/UC.   -Salt water gargles recommended as well.     You were prescribed an antibiotic, please take into consideration the following information:  - Take entire course of antibiotic even if you start to feel better.  - Antibiotics can cause stomach upset including nausea and diarrhea. Read your bottle or ask the pharmacist if antibiotic can be taken with food to help prevent nausea. If you have symptoms of diarrhea you can take an over-the-counter probiotic and/or increase foods with probiotics such as yogurt, [place], sauerkraut.  -Use caution in sunlight as can lead to increased risk of sunburn while on ABX (antibiotics).     --If prescribed Toradol, you may alternate this with Tylenol every 4-6 hours, please do NOT take any additional antiinflammatories such as Naproxen/Aleve, Motrin/Ibuprofen as these are in the same antiinflammatory class as Toradol

## 2022-01-29 ENCOUNTER — HEALTH MAINTENANCE LETTER (OUTPATIENT)
Age: 22
End: 2022-01-29

## 2022-09-02 ENCOUNTER — OFFICE VISIT (OUTPATIENT)
Dept: FAMILY MEDICINE | Facility: OTHER | Age: 22
End: 2022-09-02
Attending: NURSE PRACTITIONER
Payer: OTHER MISCELLANEOUS

## 2022-09-02 ENCOUNTER — HOSPITAL ENCOUNTER (OUTPATIENT)
Dept: GENERAL RADIOLOGY | Facility: OTHER | Age: 22
Discharge: HOME | End: 2022-09-02
Attending: NURSE PRACTITIONER
Payer: OTHER MISCELLANEOUS

## 2022-09-02 VITALS
HEIGHT: 74 IN | SYSTOLIC BLOOD PRESSURE: 120 MMHG | RESPIRATION RATE: 18 BRPM | WEIGHT: 189.6 LBS | TEMPERATURE: 98.5 F | OXYGEN SATURATION: 96 % | HEART RATE: 108 BPM | BODY MASS INDEX: 24.33 KG/M2 | DIASTOLIC BLOOD PRESSURE: 68 MMHG

## 2022-09-02 DIAGNOSIS — M79.671 PAIN IN BOTH FEET: ICD-10-CM

## 2022-09-02 DIAGNOSIS — S93.602A SPRAIN OF LEFT FOOT, INITIAL ENCOUNTER: ICD-10-CM

## 2022-09-02 DIAGNOSIS — M79.672 PAIN IN BOTH FEET: ICD-10-CM

## 2022-09-02 DIAGNOSIS — S93.601A SPRAIN OF RIGHT FOOT, INITIAL ENCOUNTER: ICD-10-CM
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
## 2022-09-02 PROCEDURE — 99213 OFFICE O/P EST LOW 20 MIN: CPT | Performed by: NURSE PRACTITIONER

## 2022-09-02 PROCEDURE — 73630 X-RAY EXAM OF FOOT: CPT | Mod: 50

## 2022-09-02 RX ORDER — LISDEXAMFETAMINE DIMESYLATE 70 MG/1
CAPSULE ORAL
COMMUNITY
Start: 2022-08-17

## 2022-09-02 RX ORDER — LORAZEPAM 0.5 MG/1
TABLET ORAL
COMMUNITY
Start: 2022-05-25

## 2022-09-02 RX ORDER — LAMOTRIGINE 100 MG/1
100 TABLET ORAL DAILY
COMMUNITY
Start: 2022-06-22

## 2022-09-02 ASSESSMENT — PAIN SCALES - GENERAL: PAINLEVEL: SEVERE PAIN (6)

## 2022-09-02 NOTE — NURSING NOTE
Pt presents to clinic today for a work comp foot injury. Patient states he was jumping off a truck and hopped off the top rack of the truck and states Hees done it before but landed on the ground wrong. Both left and right feet hurt.       FOOD SECURITY SCREENING QUESTIONS:    The next two questions are to help us understand your food security.  If you are feeling you need any assistance in this area, we have resources available to support you today.    Hunger Vital Signs:  Within the past 12 months we worried whether our food would run out before we got money to buy more. Never  Within the past 12 months the food we bought just didn't last and we didn't have money to get more. Never            Medication Reconciliation: complete  Cleve Pozo, PRISCILLA,LPN on 9/2/2022 at 9:48 AM

## 2022-09-02 NOTE — PATIENT INSTRUCTIONS
We took xr's of both feet today due to the severe pain you are experiencing after your fall/jump. There are NO broken bones in your left or your right foot. Your ankles were normal on exam. I suspect soft tissue injury, strain, and bruising following the injury.     Recommend ace bandage wrap if desired, elevate the feet, rest, ibuprofen/tylenol, and ice as you heal.     You may return to work without restrictions.

## 2022-09-02 NOTE — PROGRESS NOTES
"ASSESSMENT/PLAN:    I have reviewed the nursing notes.  I have reviewed the findings, diagnosis, plan and need for follow up with the patient.    1. Fall, initial encounter  2. Pain in both feet  - XR Foot Right G/E 3 Views  - XR Foot Left G/E 3 Views  No acute fractures of right of left feet. xr results reviewed with patient. Ankle exam were normal bilaterally. Did not xr ankles as ankles are not causing pain.     3. Sprain of left foot, initial encounter  4. Sprain of right foot, initial encounter  -treat like sprain of foot bilaterally. He may be sore for several weeks, but anticipate improvement over next week or two. Should feel better by Tuesday if he rests as able over weekend, uses compression (ace bandage wraps) elevation, ice 3-4x per day, and ibuprofen/tylenol. I explained to him that I would not anticipate he be unable to return to work as he is very concerned about how he will be able to work this way. He does not work until Tuesday, 4 days from now. Recommend follow up if ongoing concerns or worsening pain by then. May return to work on 9/6/2022 with activity as tolerated.      May use over-the-counter Tylenol or ibuprofen PRN    Discussed warning signs/symptoms indicative of need to f/u    Follow up if symptoms persist or worsen or concerns    I explained my diagnostic considerations and recommendations to the patient, who voiced understanding and agreement with the treatment plan. All questions were answered. We discussed potential side effects of any prescribed or recommended therapies, as well as expectations for response to treatments.    Bridget Mayfield NP  9/2/2022  10:27 AM    HPI:  Bruce Rodriguez is a 22 year old male who presents to [facility] today for concerns of \"sore feet.\" He was stacking wood on a roof of a truck. He then jumped from 10 feet or so to the ground. He felt \"the worst shot of pain he ever felt in his life when he landed on both feet on concrete after that jump.\" This " "occurred last night while at work. 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     No swelling or redness. The pain is the worst on the ball / heel of his right foot, hurts badly on the left as well. However, he tells me the entire feet both hurts \"a lot.\" And does not really specify where is the worst. Ankles do not hurt. No swelling of the ankles. No prior fractures of ankles or bilateral feet.     Allergies   Allergen Reactions     Seasonal Allergies      Stuffed up in the Spring and Fall     Past medical history, past surgical history, current medications and allergies reviewed and accurate to the best of my knowledge.      ROS:  Refer to HPI    /68 (BP Location: Right arm, Patient Position: Sitting, Cuff Size: Adult Large)   Pulse 108   Temp 98.5  F (36.9  C) (Tympanic)   Resp 18   Ht 1.88 m (6' 2\")   Wt 86 kg (189 lb 9.6 oz)   SpO2 96%   BMI 24.34 kg/m      EXAM:  General Appearance: Well appearing 22 year old male, appropriate appearance for age. No acute distress   Respiratory:  No increased work of breathing.  No cough appreciated.  Musculoskeletal:  Equal movement of bilateral upper extremities.  Equal movement of bilateral lower extremities.  Normal gait.    Right foot and ANKLE PHYSICAL [id number]:  Inspection: no signs of edema, bony deformity or skin abnormalities noted    Palpation: Tenderness to palpation over heel and plantar surface of the foot laterally.     ROM: plantarflexion full, dorsiflexion full, inversion full, eversion full.     Neurovascular Exam:   Pulses: Dorsalis pedis and Posterior tibial pulse 2+   Capillary refill intact < 3 seconds   Sensation intact, patient able to wiggle/move all toes    Left foot and ANKLE PHYSICAL EXAMINATION:  Inspection: no signs of edema, bony deformity or skin abnormalities noted    Palpation: Tenderness to palpation over heel and plantar surface of foot laterally.     ROM: plantarflexion full, dorsiflexion full, inversion full, " eversion full.     Neurovascular Exam:   Pulses: Dorsalis pedis and Posterior tibial pulse 2+   Capillary refill intact < 3 seconds   Sensation intact, patient able to wiggle/move all toes    Psychological: normal affect, alert, oriented, and pleasant.     Results for orders placed or performed in visit on 09/02/22   XR Foot Right G/E 3 Views     Status: None    Narrative    PROCEDURE:  XR FOOT RIGHT G/E 3 VIEWS    HISTORY: r/o fxs. right heel pain. also pain over top of feet  bilaterally after a 10 foot jump onto concrete; Fall, initial  encounter; Pain in both feet; Pain in both feet    COMPARISON:  None.    TECHNIQUE:  3 views of the right foot were obtained.    FINDINGS:  No fracture or dislocation is identified. The joint spaces  are preserved.        Impression    IMPRESSION: No acute fracture.      JOSEE GANT MD         SYSTEM ID:  [id number]   XR Foot Left G/E 3 Views     Status: None    Narrative    PROCEDURE:  XR FOOT LEFT G/E 3 VIEWS    HISTORY: generalized severe foot pain bilaterally after a 10 foot jump  onto concrete; Fall, initial encounter; Pain in both feet; Pain in  both feet    COMPARISON:  None.    TECHNIQUE:  3 views of the left foot were obtained.    FINDINGS:  No fracture or dislocation is identified. The joint spaces  are preserved.        Impression    IMPRESSION: No acute fracture.      JOSEE GANT MD         SYSTEM ID:  [id number]

## 2022-09-07 ENCOUNTER — TELEPHONE (OUTPATIENT)
Dept: FAMILY MEDICINE | Facility: OTHER | Age: 22
End: 2022-09-07

## 2022-09-07 NOTE — TELEPHONE ENCOUNTER
JULIUS contacted the patient regarding f/up Work Comp visit. The patient is not going to doctor for care, declines a follow up appointment.  Miranda Dominguez on 9/7/2022 at 4:25 PM

## 2022-09-17 ENCOUNTER — HEALTH MAINTENANCE LETTER (OUTPATIENT)
Age: 22
End: 2022-09-17

## 2022-09-22 ENCOUNTER — HOSPITAL ENCOUNTER (OUTPATIENT)
Dept: MRI IMAGING | Facility: OTHER | Age: 22
Discharge: HOME | End: 2022-09-22
Attending: FAMILY MEDICINE | Admitting: FAMILY MEDICINE
Payer: COMMERCIAL

## 2022-09-22 DIAGNOSIS — M79.671 RIGHT FOOT PAIN: ICD-10-CM

## 2022-09-22 PROCEDURE — 73718 MRI LOWER EXTREMITY W/O DYE: CPT | Mod: RT

## 2023-05-06 ENCOUNTER — HEALTH MAINTENANCE LETTER (OUTPATIENT)
Age: 23
End: 2023-05-06

## 2024-07-13 ENCOUNTER — HEALTH MAINTENANCE LETTER (OUTPATIENT)
Age: 24
End: 2024-07-13

## 2025-07-19 ENCOUNTER — HEALTH MAINTENANCE LETTER (OUTPATIENT)
Age: 25
End: 2025-07-19

## (undated) RX ORDER — LIDOCAINE HYDROCHLORIDE AND EPINEPHRINE 10; 10 MG/ML; UG/ML
INJECTION, SOLUTION INFILTRATION; PERINEURAL
Status: DISPENSED
Start: 2020-11-15

## (undated) RX ORDER — NICOTINE 21 MG/24HR
PATCH, TRANSDERMAL 24 HOURS TRANSDERMAL
Status: DISPENSED
Start: 2018-10-30